# Patient Record
(demographics unavailable — no encounter records)

---

## 2017-04-11 NOTE — NUR
Patient discharged with v/s stable. Written and verbal after care instructions 
given and explained to parent/guardian. Parent/Guardian verbalized 
understanding of instructions. Ambulatory with by parent. All questions 
addressed prior to discharge. ID band removed. Parent/Guardian advised to 
follow up with PMD. Rx of ZOFRAN given. Parent/Guardian educated on indication 
of medication including possible reaction and side effects. Opportunity to ask 
questions provided and answered.

## 2017-04-11 NOTE — NUR
6/F bib mother for evaluation of N/V/D since 0400 this am. Patient also c/o 
abdominal pain. Abdomen is soft, non tender, active kristina sounds x 4 quadrants. 
Afebrile. Mother reports patient having x1 episode of vomiting and x3 episodes 
of diarrhea. Patient is awake and alert appropriate to age. VSS.

## 2018-03-11 NOTE — NUR
PT BIB WITH C/O N/V/D SINCE 400 AFTER EATING CUPCAKE BOUGHT BY MOM LAST NIGHT; 
SKIN IS INTACT, PINK/WARM/DRY; AAOX4, PERRL, WITH EVEN AND STEADY GAIT; LUNGS 
CLEAR BL, BREATHING UNLABORED; HR EVEN AND REGULAR, BL PERIPHERAL PULSES 
PRESENT; BS ACTIVE X4; PT DENIES ANY FEVER, CP, SOB, OR COUGH AT THIS TIME; PT 
STATES 0/10 PAIN AT THIS TIME; VSS; PATIENT POSITIONED FOR COMFORT; HOB 
ELEVATED; BEDRAILS UP X2; BED DOWN.

## 2018-03-11 NOTE — NUR
Patient discharged with v/s stable. Written and verbal after care instructions 
given and explained to parent/guardian. Parent/Guardian verbalized 
understanding of instructions. Ambulatory with steady gait. All questions 
addressed prior to discharge. ID band removed. Parent/Guardian advised to 
follow up with PMD. Rx of ALBUTEROL 90 MCG/ACUTATION & ZOFRAN ODT 4MG given. 
Parent/Guardian educated on indication of medication including possible 
reaction and side effects. Opportunity to ask questions provided and answered.